# Patient Record
Sex: MALE | Race: WHITE | NOT HISPANIC OR LATINO
[De-identification: names, ages, dates, MRNs, and addresses within clinical notes are randomized per-mention and may not be internally consistent; named-entity substitution may affect disease eponyms.]

---

## 2017-01-19 ENCOUNTER — APPOINTMENT (OUTPATIENT)
Dept: RHEUMATOLOGY | Facility: CLINIC | Age: 37
End: 2017-01-19

## 2017-02-22 ENCOUNTER — APPOINTMENT (OUTPATIENT)
Dept: RHEUMATOLOGY | Facility: CLINIC | Age: 37
End: 2017-02-22

## 2017-03-23 ENCOUNTER — APPOINTMENT (OUTPATIENT)
Dept: RHEUMATOLOGY | Facility: CLINIC | Age: 37
End: 2017-03-23

## 2017-04-19 ENCOUNTER — APPOINTMENT (OUTPATIENT)
Dept: RHEUMATOLOGY | Facility: CLINIC | Age: 37
End: 2017-04-19

## 2017-05-16 ENCOUNTER — APPOINTMENT (OUTPATIENT)
Dept: RHEUMATOLOGY | Facility: CLINIC | Age: 37
End: 2017-05-16

## 2017-06-14 ENCOUNTER — APPOINTMENT (OUTPATIENT)
Dept: RHEUMATOLOGY | Facility: CLINIC | Age: 37
End: 2017-06-14

## 2017-06-16 ENCOUNTER — APPOINTMENT (OUTPATIENT)
Dept: RHEUMATOLOGY | Facility: CLINIC | Age: 37
End: 2017-06-16

## 2017-07-12 ENCOUNTER — APPOINTMENT (OUTPATIENT)
Dept: RHEUMATOLOGY | Facility: CLINIC | Age: 37
End: 2017-07-12

## 2017-08-09 ENCOUNTER — APPOINTMENT (OUTPATIENT)
Dept: RHEUMATOLOGY | Facility: CLINIC | Age: 37
End: 2017-08-09

## 2017-09-06 ENCOUNTER — APPOINTMENT (OUTPATIENT)
Dept: RHEUMATOLOGY | Facility: CLINIC | Age: 37
End: 2017-09-06
Payer: COMMERCIAL

## 2017-09-06 PROCEDURE — 96413 CHEMO IV INFUSION 1 HR: CPT

## 2018-05-15 ENCOUNTER — APPOINTMENT (OUTPATIENT)
Dept: COLORECTAL SURGERY | Facility: CLINIC | Age: 38
End: 2018-05-15

## 2019-04-08 ENCOUNTER — APPOINTMENT (OUTPATIENT)
Dept: INTERNAL MEDICINE | Facility: CLINIC | Age: 39
End: 2019-04-08

## 2020-02-18 ENCOUNTER — OUTPATIENT (OUTPATIENT)
Dept: OUTPATIENT SERVICES | Facility: HOSPITAL | Age: 40
LOS: 1 days | End: 2020-02-18
Payer: COMMERCIAL

## 2020-02-18 DIAGNOSIS — M25.512 PAIN IN LEFT SHOULDER: Chronic | ICD-10-CM

## 2020-02-18 DIAGNOSIS — R09.89 OTHER SPECIFIED SYMPTOMS AND SIGNS INVOLVING THE CIRCULATORY AND RESPIRATORY SYSTEMS: ICD-10-CM

## 2020-02-18 DIAGNOSIS — Z98.89 OTHER SPECIFIED POSTPROCEDURAL STATES: Chronic | ICD-10-CM

## 2020-02-18 PROCEDURE — 93922 UPR/L XTREMITY ART 2 LEVELS: CPT | Mod: 26

## 2020-02-18 PROCEDURE — 93923 UPR/LXTR ART STDY 3+ LVLS: CPT

## 2021-01-25 ENCOUNTER — NON-APPOINTMENT (OUTPATIENT)
Age: 41
End: 2021-01-25

## 2021-01-25 DIAGNOSIS — Z82.69 FAMILY HISTORY OF OTHER DISEASES OF THE MUSCULOSKELETAL SYSTEM AND CONNECTIVE TISSUE: ICD-10-CM

## 2021-01-25 DIAGNOSIS — Z86.19 PERSONAL HISTORY OF OTHER INFECTIOUS AND PARASITIC DISEASES: ICD-10-CM

## 2021-01-25 DIAGNOSIS — Z87.828 PERSONAL HISTORY OF OTHER (HEALED) PHYSICAL INJURY AND TRAUMA: ICD-10-CM

## 2021-01-25 DIAGNOSIS — M32.9 SYSTEMIC LUPUS ERYTHEMATOSUS, UNSPECIFIED: ICD-10-CM

## 2021-01-25 DIAGNOSIS — R76.8 OTHER SPECIFIED ABNORMAL IMMUNOLOGICAL FINDINGS IN SERUM: ICD-10-CM

## 2021-01-25 DIAGNOSIS — J45.909 UNSPECIFIED ASTHMA, UNCOMPLICATED: ICD-10-CM

## 2021-01-25 DIAGNOSIS — Z78.9 OTHER SPECIFIED HEALTH STATUS: ICD-10-CM

## 2021-01-25 DIAGNOSIS — K62.5 HEMORRHAGE OF ANUS AND RECTUM: ICD-10-CM

## 2021-01-25 DIAGNOSIS — M87.00 IDIOPATHIC ASEPTIC NECROSIS OF UNSPECIFIED BONE: ICD-10-CM

## 2021-01-25 DIAGNOSIS — Z98.890 OTHER SPECIFIED POSTPROCEDURAL STATES: ICD-10-CM

## 2021-02-08 ENCOUNTER — APPOINTMENT (OUTPATIENT)
Dept: RHEUMATOLOGY | Facility: CLINIC | Age: 41
End: 2021-02-08

## 2021-02-08 VITALS
TEMPERATURE: 97.6 F | HEART RATE: 95 BPM | OXYGEN SATURATION: 98 % | SYSTOLIC BLOOD PRESSURE: 126 MMHG | DIASTOLIC BLOOD PRESSURE: 79 MMHG | RESPIRATION RATE: 16 BRPM | WEIGHT: 202.4 LBS

## 2021-02-08 RX ORDER — METHYLPREDNISOLONE 16 MG/1
TABLET ORAL
Refills: 0 | Status: DISCONTINUED | COMMUNITY
End: 2021-02-08

## 2021-02-08 NOTE — ASSESSMENT
[FreeTextEntry1] : A/P\par 39 yo male with SLE for many years; MTX re-started/added to Benlysta 1/8/21. He still has moderate disease activity with polyarthritis andmalar rash; no improvement yet from the MTX but it is too soon. Plan:\par - check labs\par -increase MTX to 25 mg/week\par - increase prednisone to 30 mg qd and taper to 20 on 2/22/21 and back to 10 on 3/1/21. If necessary, he may return on 2/12/21 for a medrol infusion.\par - check 25-OH D level next visit\par - rtc 6 weeks\par - COVID-19 vaccine discussed; he plans to get this ASAP\par \par HUANG Mcguire\par [phone]\par [email or web address]\par

## 2021-02-08 NOTE — HISTORY OF PRESENT ILLNESS
[FreeTextEntry1] : Follow-up visit. The patient is a 39 yo  male with a history of:\par \par SLE: diagnosed 2008 with non-erosive polyarthritis, malar rash, leucopenia, fever, Raynauds, RICHELLE+, anti-DNA+, Ro+, RNP+, Sm+, La negative, LAC negative, ACL negative. He was treated initially with plaquenil, steroids and methotrexate. He was weaned off the steroids by  and continued plaquenil 200 qd and MTX 20 mg/week until 6/10 when he developed nephritis, recurrent joint pain and rash; told he did not need a kidney biopsy because the proteinuria was only 2+ and he was started on prednisone 60 mg qd.  mg BID added to prednisone 60 in 10/10 following an episode of headache (MRI results not available, LP: protein 22, wbc 3, rbc 106, VDRL negative). He remained on prednisone 60 qd and MMF 1000 mg qd until 12/10 when he started seeing me.  \par At his visit 12/28/10 he had 600 mg protein (results from 10/10 through 12/10 all had no proteinuria except one UA with 2+) and the MMF was increased to 3 gm qd. The proteinuria resolved but he had frequent episodes of recurrent arthritis, chilblains on the distal fingers, malar rash and was unable to taper the prednisone to less than 15 mg qd. MMF was stopped 3/23/11 and MTX was restarted.  He had a good response to the MTX which was increased to 25 mg/week; the prednisone was tapered off 10/11.  During this time the complement remained normal and the anti-DNA ab in the 50-90 range. His MTX was decreased to 20 mg/week at his visit 3/5/12. He had another flare with arthritis, malar rash, fatigue and increased anti-DNA  and was enrolled in a double blind study of a BAFF inhibitor 12 that extended to an open-label study  –  when the study ended. His SLE was well-controlled in the study. He continued on MTX 20 and HCQ but flared again 7/15 and was treated with solumedrol 250 mg IVSS and prednisone 10 mg qd was added. IV Benlysta was added 8/11/15 and tapered off the prednisone by 9/7/15. MTX was decreased to 15/week in 3/16, 10/week 16 and stopped 16. He had a mild flare with arthritis and malar rash  and subsequently switched to SQ Benlysta 10/16/18; was taking every other week until  when he switched to q week. Has had persistent disease activity with arthritis, malar rash since  despite enrollment in the AISHA-09 trial (lenabasum/placebo)  – . Required solumedrol 250 mg IVSS  and was on prednisone 10 qd since 18. Severe flare 19; increased steroids to 40 qd for 1 week then to 20. Solumedrol 250 IVSS again on 19 and azathioprine added but he decided not to take this and tapered steroids off by . Flare 10/20 with polyarthritis, patchy alopecia and malar rash on Benlysta; MTX re-started 21.\par \par AVN: has moderate to severe AVN in multiple sites; BL shoulders, BL hips, right knee. S/p BL shoulder replacements  and . Pain occasionally requires PRN Percocet. ACL ab and LAC negative on multiple occasions. S/p corticosteroid injection R hip 10/16\par \par Hemorrhoids: episodic BRBPR due to steroids\par \par Asthma: new onset . CT chest 19: unremarkable, PFTs c/w asthma as per pulmonologist (Dr. Langford, [phone]); resolved eventually with steroids and Breztri. Note: Ig levels 20 low normal,  Legionella, fungitell negative, Tcell subsets normal/high, CD19 48.\par \par ACL tear : severed ACL in an accident; had 2 arthrocenteses of bloody fluid and a corticosteroid injection\par \par Last Ophtho: \par \par Current Hx: The patient was last seen 21 and started MTX for polyarthritis. Says that nothing is better; all joints are swollen and hurt. No wheezing and he has stopped the Breztri. I thought he had stopped the Benlysta but he is still taking it once a week. Denies fevers, nausea, vomiting, diarrhea, abdominal pain, or weight loss. \par \par Allergies:  NSAIDS: cause throat and lip swelling, PCN: rash\par \par Medications:\par Prednisone 10 (started 10/21/20; tapered to 10 on 21) \par Simvastatin 20 (20)\par plaquenil 400 mg qd ((12/28/10)\par CaCO3/vit D BID\par Nifedepine XL 30 mg (11; not taking in the summer)\par Albuterol PRN\par Breztri 2 puffs BID\par Folic acid 1 mg\par Methotrexate 20/week (21)\par Benlysta 200 mg SC/week (start IV 8/15, switch to SC 10/17)\par \par Social: no history of ETOH, tobacco or drug abuse\par             , employed as a , 1 biologic son, 1 adopted son\par \par Family Hx: paternal aunt  of SLE\par Past Hx:  tinea treated with itraconazole \par

## 2021-02-12 ENCOUNTER — APPOINTMENT (OUTPATIENT)
Dept: RHEUMATOLOGY | Facility: CLINIC | Age: 41
End: 2021-02-12

## 2021-02-12 VITALS
SYSTOLIC BLOOD PRESSURE: 125 MMHG | DIASTOLIC BLOOD PRESSURE: 81 MMHG | RESPIRATION RATE: 16 BRPM | OXYGEN SATURATION: 99 % | HEART RATE: 81 BPM | WEIGHT: 201 LBS | TEMPERATURE: 97.7 F

## 2021-02-12 NOTE — HISTORY OF PRESENT ILLNESS
[FreeTextEntry1] : Patient here for solumedrol infusion 250 mg IVSS x 1. He had increased the oral steroids and MTX as discussed at the visit on 2/8/21 but is still experiencing moderate/severe joint pain. He tolerated the infusion well and will follow up in 5 weeks.

## 2021-03-24 ENCOUNTER — APPOINTMENT (OUTPATIENT)
Dept: RHEUMATOLOGY | Facility: CLINIC | Age: 41
End: 2021-03-24

## 2021-03-24 VITALS
WEIGHT: 204.4 LBS | TEMPERATURE: 97.4 F | DIASTOLIC BLOOD PRESSURE: 74 MMHG | RESPIRATION RATE: 18 BRPM | SYSTOLIC BLOOD PRESSURE: 120 MMHG | OXYGEN SATURATION: 96 % | HEART RATE: 93 BPM

## 2021-03-24 RX ORDER — MULTIVITAMIN/IRON/FOLIC ACID 18MG-0.4MG
500-400 TABLET ORAL
Qty: 60 | Refills: 5 | Status: ACTIVE | COMMUNITY
Start: 2021-03-24 | End: 1900-01-01

## 2021-03-24 NOTE — ASSESSMENT
[FreeTextEntry1] : A/P\par 41 yo male with SLE for many years; MTX re-started/added to Benlysta 1/8/21. He still has mild/moderate disease activity with polyarthritis and malar rash. There has been some improvement from the steroids and MTX; will give the MTX more time. If there is continued improvemment he should stay on the Benlysta. If not, he should DC it in 2 weeks.Plan:\par - check labs including 25-OH D\par - continue current medications and decrease prednisone to 7.5 if possible\par - rtc 5/12/21; obtain post-vaccine labs then\par \par HUANG Mcguire\par [phone]\par [email or web address]\par

## 2021-03-24 NOTE — HISTORY OF PRESENT ILLNESS
[FreeTextEntry1] : Follow-up visit. The patient is a 41 yo  male with a history of:\par \par SLE: diagnosed 2008 with non-erosive polyarthritis, malar rash, leucopenia, fever, Raynauds, RICHELLE+, anti-DNA+, Ro+, RNP+, Sm+, La negative, LAC negative, ACL negative. He was treated initially with plaquenil, steroids and methotrexate. He was weaned off the steroids by  and continued plaquenil 200 qd and MTX 20 mg/week until 6/10 when he developed nephritis, recurrent joint pain and rash; told he did not need a kidney biopsy because the proteinuria was only 2+ and he was started on prednisone 60 mg qd.  mg BID added to prednisone 60 in 10/10 following an episode of headache (MRI results not available, LP: protein 22, wbc 3, rbc 106, VDRL negative). He remained on prednisone 60 qd and MMF 1000 mg qd until 12/10 when he started seeing me.  \par At his visit 12/28/10 he had 600 mg protein (results from 10/10 through 12/10 all had no proteinuria except one UA with 2+) and the MMF was increased to 3 gm qd. The proteinuria resolved but he had frequent episodes of recurrent arthritis, chilblains on the distal fingers, malar rash and was unable to taper the prednisone to less than 15 mg qd. MMF was stopped 3/23/11 and MTX was restarted.  He had a good response to the MTX which was increased to 25 mg/week; the prednisone was tapered off 10/11.  During this time the complement remained normal and the anti-DNA ab in the 50-90 range. His MTX was decreased to 20 mg/week at his visit 3/5/12. He had another flare with arthritis, malar rash, fatigue and increased anti-DNA  and was enrolled in a double blind study of a BAFF inhibitor 12 that extended to an open-label study  –  when the study ended. His SLE was well-controlled in the study. He continued on MTX 20 and HCQ but flared again 7/15 and was treated with solumedrol 250 mg IVSS and prednisone 10 mg qd was added. IV Benlysta was added 8/11/15 and tapered off the prednisone by 9/7/15. MTX was decreased to 15/week in 3/16, 10/week 16 and stopped 16. He had a mild flare with arthritis and malar rash  and subsequently switched to SQ Benlysta 10/16/18; was taking every other week until  when he switched to q week. Has had persistent disease activity with arthritis, malar rash since  despite enrollment in the AISHA-09 trial (lenabasum/placebo)  – . Required solumedrol 250 mg IVSS  and was on prednisone 10 qd since 18. Severe flare 19; increased steroids to 40 qd for 1 week then to 20. Solumedrol 250 IVSS again on 19 and azathioprine added but he decided not to take this and tapered steroids off by . Flare 10/20 with polyarthritis, patchy alopecia and malar rash on Benlysta; MTX re-started 21.\par \par AVN: has moderate to severe AVN in multiple sites; BL shoulders, BL hips, right knee. S/p BL shoulder replacements  and . Pain occasionally requires PRN Percocet. ACL ab and LAC negative on multiple occasions. S/p corticosteroid injection R hip 10/16\par \par Hemorrhoids: episodic BRBPR due to steroids\par \par Asthma: new onset . CT chest 19: unremarkable, PFTs c/w asthma as per pulmonologist (Dr. Langford, [phone]); resolved eventually with steroids and Breztri. Note: Ig levels 20 low normal,  Legionella, fungitell negative, Tcell subsets normal/high, CD19 48.\par \par ACL tear : severed ACL in an accident; had 2 arthrocenteses of bloody fluid and a corticosteroid injection\par \par Last Ophtho: \par \par Current Hx: The patient was last seen 21; still had severe polyarthritis and MTX was increased to 25/week. He also had a solumedrol 250 mg infusion on 21 that helped. He says he still has joint pain but not as bad- it is tolerable and he tapered the prednisone to 10 on 3/1/21. Developed a stye in the R eye last week; minimal improvement with warm soaks. Denies fevers, nausea, vomiting, diarrhea, abdominal pain, or weight loss. Had the J&J COVID vaccine on 3/5/21: experienced increased joint/muscle pain, HA, fatigue for 1 day, no fevers, + sore arm for 1 week. \par \par Allergies:  NSAIDS: cause throat and lip swelling, PCN: rash\par \par Medications:\par Prednisone 10 (started 10/21/20; tapered to 10 on 3/1/21) \par Simvastatin 20 (20)\par plaquenil 400 mg qd ((12/28/10)\par CaCO3/vit D BID\par Nifedepine XL 30 mg (11; not taking in the summer)\par Albuterol PRN\par Breztri 2 puffs BID\par Folic acid 1 mg\par Methotrexate 25/week (21)\par Benlysta 200 mg SC/week (start IV 8/15, switch to SC 10/17)\par \par Social: no history of ETOH, tobacco or drug abuse\par             , employed as a , 1 biologic son, 1 adopted son\par \par Family Hx: paternal aunt  of SLE\par Past Hx:  tinea treated with itraconazole \par

## 2021-04-14 LAB
25(OH)D3 SERPL-MCNC: 24.2 NG/ML
ALBUMIN SERPL ELPH-MCNC: 4.1 G/DL
ALP BLD-CCNC: 69 U/L
ALT SERPL-CCNC: 20 U/L
ANION GAP SERPL CALC-SCNC: 15 MMOL/L
APPEARANCE: CLEAR
AST SERPL-CCNC: 18 U/L
BACTERIA: NEGATIVE
BASOPHILS # BLD AUTO: 0.07 K/UL
BASOPHILS NFR BLD AUTO: 0.7 %
BILIRUB SERPL-MCNC: 0.3 MG/DL
BILIRUBIN URINE: NEGATIVE
BLOOD URINE: NEGATIVE
BUN SERPL-MCNC: 19 MG/DL
C3 SERPL-MCNC: 127 MG/DL
C4 SERPL-MCNC: 26 MG/DL
CALCIUM SERPL-MCNC: 9.1 MG/DL
CHLORIDE SERPL-SCNC: 98 MMOL/L
CO2 SERPL-SCNC: 27 MMOL/L
COLOR: COLORLESS
CREAT SERPL-MCNC: 0.88 MG/DL
CRP SERPL-MCNC: <3 MG/L
DSDNA AB SER-ACNC: <12 IU/ML
EOSINOPHIL # BLD AUTO: 0.08 K/UL
EOSINOPHIL NFR BLD AUTO: 0.8 %
GLUCOSE QUALITATIVE U: NEGATIVE
GLUCOSE SERPL-MCNC: 54 MG/DL
HCT VFR BLD CALC: 42.2 %
HGB BLD-MCNC: 14.1 G/DL
HYALINE CASTS: 0 /LPF
IMM GRANULOCYTES NFR BLD AUTO: 0.5 %
KETONES URINE: NEGATIVE
LEUKOCYTE ESTERASE URINE: NEGATIVE
LYMPHOCYTES # BLD AUTO: 3.79 K/UL
LYMPHOCYTES NFR BLD AUTO: 38 %
MAN DIFF?: NORMAL
MCHC RBC-ENTMCNC: 32 PG
MCHC RBC-ENTMCNC: 33.4 GM/DL
MCV RBC AUTO: 95.7 FL
MICROSCOPIC-UA: NORMAL
MONOCYTES # BLD AUTO: 0.7 K/UL
MONOCYTES NFR BLD AUTO: 7 %
NEUTROPHILS # BLD AUTO: 5.29 K/UL
NEUTROPHILS NFR BLD AUTO: 53 %
NITRITE URINE: NEGATIVE
PH URINE: 5.5
PLATELET # BLD AUTO: 341 K/UL
POTASSIUM SERPL-SCNC: 3.4 MMOL/L
PROT SERPL-MCNC: 6.3 G/DL
PROTEIN URINE: NEGATIVE
RBC # BLD: 4.41 M/UL
RBC # FLD: 12.1 %
RED BLOOD CELLS URINE: 0 /HPF
SODIUM SERPL-SCNC: 140 MMOL/L
SPECIFIC GRAVITY URINE: 1.01
SQUAMOUS EPITHELIAL CELLS: 0 /HPF
UROBILINOGEN URINE: NORMAL
WBC # FLD AUTO: 9.98 K/UL
WHITE BLOOD CELLS URINE: 0 /HPF

## 2021-05-24 ENCOUNTER — APPOINTMENT (OUTPATIENT)
Dept: RHEUMATOLOGY | Facility: CLINIC | Age: 41
End: 2021-05-24

## 2021-06-16 ENCOUNTER — LABORATORY RESULT (OUTPATIENT)
Age: 41
End: 2021-06-16

## 2021-06-16 ENCOUNTER — APPOINTMENT (OUTPATIENT)
Dept: RHEUMATOLOGY | Facility: CLINIC | Age: 41
End: 2021-06-16

## 2021-06-16 VITALS
WEIGHT: 208.4 LBS | HEART RATE: 98 BPM | RESPIRATION RATE: 14 BRPM | DIASTOLIC BLOOD PRESSURE: 89 MMHG | SYSTOLIC BLOOD PRESSURE: 132 MMHG | TEMPERATURE: 96.7 F | OXYGEN SATURATION: 100 %

## 2021-06-16 DIAGNOSIS — E78.00 PURE HYPERCHOLESTEROLEMIA, UNSPECIFIED: ICD-10-CM

## 2021-06-16 RX ORDER — BELIMUMAB 200 MG/ML
200 SOLUTION SUBCUTANEOUS
Refills: 0 | Status: DISCONTINUED | COMMUNITY
Start: 2020-06-26 | End: 2021-06-16

## 2021-06-16 RX ORDER — SIMVASTATIN 80 MG/1
TABLET, FILM COATED ORAL
Refills: 0 | Status: DISCONTINUED | COMMUNITY
End: 2021-06-16

## 2021-06-16 NOTE — ASSESSMENT
[FreeTextEntry1] : A/P\par 42 yo male with SLE for many years; MTX re-started/added to Benlysta 1/8/21. He continues to have moderate disease activity with polyarthritis and malar rash despite re-starting the MTX. Benlysta stopped due to lack of efficacy. Discussed therapeutic options including azathioprine, clinical trial. Plan:\par - check labs including anti-spike ab\par - he will think about treatment options\par - add alendronate 70 mg /week and ergocalciferol 50,000 units/week x 8 weeks\par - continue current medications \par - has an appt with his orthopedic surgeon, Dr. Paget at [facility] in July- may have THR\par - f/u 6 weeks\par \par HUANG Mcguire\par [phone]\par [email or web address]\par

## 2021-06-16 NOTE — HISTORY OF PRESENT ILLNESS
[FreeTextEntry1] : Follow-up visit. The patient is a 42 yo  male with a history of:\par \par SLE: diagnosed 2008 with non-erosive polyarthritis, malar rash, leucopenia, fever, Raynauds, RICHELLE+, anti-DNA+, Ro+, RNP+, Sm+, La negative, LAC negative, ACL negative. He was treated initially with plaquenil, steroids and methotrexate. He was weaned off the steroids by  and continued plaquenil 200 qd and MTX 20 mg/week until 6/10 when he developed nephritis, recurrent joint pain and rash; told he did not need a kidney biopsy because the proteinuria was only 2+ and he was started on prednisone 60 mg qd.  mg BID added to prednisone 60 in 10/10 following an episode of headache (MRI results not available, LP: protein 22, wbc 3, rbc 106, VDRL negative). He remained on prednisone 60 qd and MMF 1000 mg qd until 12/10 when he started seeing me.  \par At his visit 12/28/10 he had 600 mg protein (results from 10/10 through 12/10 all had no proteinuria except one UA with 2+) and the MMF was increased to 3 gm qd. The proteinuria resolved but he had frequent episodes of recurrent arthritis, chilblains on the distal fingers, malar rash and was unable to taper the prednisone to less than 15 mg qd. MMF was stopped 3/23/11 and MTX was restarted.  He had a good response to the MTX which was increased to 25 mg/week; the prednisone was tapered off 10/11.  During this time the complement remained normal and the anti-DNA ab in the 50-90 range. His MTX was decreased to 20 mg/week at his visit 3/5/12. He had another flare with arthritis, malar rash, fatigue and increased anti-DNA  and was enrolled in a double blind study of a BAFF inhibitor 12 that extended to an open-label study  –  when the study ended. His SLE was well-controlled in the study. He continued on MTX 20 and HCQ but flared again 7/15 and was treated with solumedrol 250 mg IVSS and prednisone 10 mg qd was added. IV Benlysta was added 8/11/15 and tapered off the prednisone by 9/7/15. MTX was decreased to 15/week in 3/16, 10/week 16 and stopped 16. He had a mild flare with arthritis and malar rash  and subsequently switched to SQ Benlysta 10/16/18; was taking every other week until  when he switched to q week. Has had persistent disease activity with arthritis, malar rash since  despite enrollment in the AISHA-09 trial (lenabasum/placebo)  – . Required solumedrol 250 mg IVSS  and was on prednisone 10 qd since 18. Severe flare 19; increased steroids to 40 qd for 1 week then to 20. Solumedrol 250 IVSS again on 19 and azathioprine added but he decided not to take this and tapered steroids off by . Flare 10/20 with polyarthritis, patchy alopecia and malar rash on Benlysta; MTX re-started 21. Benlysta stopped (last dose 3/27/21) due to lack of efficacy.\par \par AVN: has moderate to severe AVN in multiple sites; BL shoulders, BL hips, right knee. S/p BL shoulder replacements  and . Pain occasionally requires PRN Percocet. ACL ab and LAC negative on multiple occasions. S/p corticosteroid injection R hip 10/16\par \par Hemorrhoids: episodic BRBPR due to steroids\par \par Asthma: new onset . CT chest 19: unremarkable, PFTs c/w asthma as per pulmonologist (Dr. Langford, [phone]); resolved eventually with steroids and Breztri. Note: Ig levels 20 low normal,  Legionella, fungitell negative, Tcell subsets normal/high, CD19 48.\par \par ACL tear : severed ACL in an accident; had 2 arthrocenteses of bloody fluid and a corticosteroid injection\par \par Last Ophtho: \par \par J&J COVID vaccine on 3/5/21: experienced increased joint/muscle pain, HA, fatigue for 1 day, no fevers, + sore arm for 1 week. \par \par Current Hx: The patient was last seen ; polyarthritis only slightly improved on higher dose MTX. He stopped the benlysta 3/27/21 (last dose) due to lack of efficacy. Since then he continues to have pain and swelling in the elbows, hands, knees and left ankle. Also developed sever pain in the left foot that was diagnosed as plantar fasciitis and responded to a corticosteroid injection. Has started having recurrent episodes of severe right hip pain requiring a cane to walk sometimes due to AVN. Stepped on a nail that punctured the left foot- treated in Urgent Care with debridement, tetanus shot and a course of levaquin. Also notes increased overall fatigue but also has increased stress- his mother  on 21 after prolonged illness. Denies fevers, nausea, vomiting, diarrhea, abdominal pain, or weight loss. \par \par Allergies:  NSAIDS: cause throat and lip swelling, PCN: rash\par \par Medications:\par Prednisone 10 (started 10/21/20; tapered to 10 on 3/1/21) \par Simvastatin 20 (20)\par plaquenil 400 mg qd ((12/28/10)\par CaCO3/vit D BID\par Nifedepine XL 30 mg (11; not taking in the summer)\par Albuterol PRN\par Breztri 2 puffs BID\par Folic acid 1 mg\par Methotrexate 25/week (21)\par \par \par Social: no history of ETOH, tobacco or drug abuse\par             , employed as a , 1 biologic son, 1 adopted son\par \par Family Hx: paternal aunt  of SLE\par Past Hx:  tinea treated with itraconazole \par

## 2021-07-02 LAB
ALBUMIN SERPL ELPH-MCNC: 4.4 G/DL
ALP BLD-CCNC: 79 U/L
ALT SERPL-CCNC: 21 U/L
ANION GAP SERPL CALC-SCNC: 17 MMOL/L
APPEARANCE: CLEAR
AST SERPL-CCNC: 23 U/L
BACTERIA: NEGATIVE
BASOPHILS # BLD AUTO: 0.04 K/UL
BASOPHILS NFR BLD AUTO: 0.4 %
BILIRUB SERPL-MCNC: 0.2 MG/DL
BILIRUBIN URINE: NEGATIVE
BLOOD URINE: NEGATIVE
BUN SERPL-MCNC: 25 MG/DL
C3 SERPL-MCNC: 148 MG/DL
C4 SERPL-MCNC: 26 MG/DL
CALCIUM SERPL-MCNC: 9.4 MG/DL
CHLORIDE SERPL-SCNC: 99 MMOL/L
CO2 SERPL-SCNC: 24 MMOL/L
COLOR: NORMAL
CREAT SERPL-MCNC: 0.86 MG/DL
CREAT SPEC-SCNC: 163 MG/DL
CREAT/PROT UR: 0.1 RATIO
DSDNA AB SER-ACNC: <12 IU/ML
EOSINOPHIL # BLD AUTO: 0.08 K/UL
EOSINOPHIL NFR BLD AUTO: 0.9 %
GLUCOSE QUALITATIVE U: NEGATIVE
GLUCOSE SERPL-MCNC: 56 MG/DL
HCT VFR BLD CALC: 42.1 %
HGB BLD-MCNC: 13.4 G/DL
HYALINE CASTS: 1 /LPF
IMM GRANULOCYTES NFR BLD AUTO: 0.3 %
KETONES URINE: NEGATIVE
LEUKOCYTE ESTERASE URINE: NEGATIVE
LYMPHOCYTES # BLD AUTO: 1.3 K/UL
LYMPHOCYTES NFR BLD AUTO: 14.4 %
MAN DIFF?: NORMAL
MCHC RBC-ENTMCNC: 30.8 PG
MCHC RBC-ENTMCNC: 31.8 GM/DL
MCV RBC AUTO: 96.8 FL
MICROSCOPIC-UA: NORMAL
MONOCYTES # BLD AUTO: 0.33 K/UL
MONOCYTES NFR BLD AUTO: 3.6 %
NEUTROPHILS # BLD AUTO: 7.27 K/UL
NEUTROPHILS NFR BLD AUTO: 80.4 %
NITRITE URINE: NEGATIVE
PH URINE: 5.5
PLATELET # BLD AUTO: 326 K/UL
POTASSIUM SERPL-SCNC: 4.1 MMOL/L
PROT SERPL-MCNC: 6.2 G/DL
PROT UR-MCNC: 13 MG/DL
PROTEIN URINE: NORMAL
RBC # BLD: 4.35 M/UL
RBC # FLD: 12.8 %
RED BLOOD CELLS URINE: 0 /HPF
SODIUM SERPL-SCNC: 141 MMOL/L
SPECIFIC GRAVITY URINE: 1.02
SQUAMOUS EPITHELIAL CELLS: 0 /HPF
UROBILINOGEN URINE: NORMAL
WBC # FLD AUTO: 9.05 K/UL
WHITE BLOOD CELLS URINE: 0 /HPF

## 2021-07-28 ENCOUNTER — APPOINTMENT (OUTPATIENT)
Dept: RHEUMATOLOGY | Facility: CLINIC | Age: 41
End: 2021-07-28

## 2021-07-28 VITALS
DIASTOLIC BLOOD PRESSURE: 87 MMHG | WEIGHT: 207.2 LBS | RESPIRATION RATE: 14 BRPM | OXYGEN SATURATION: 98 % | TEMPERATURE: 96.9 F | SYSTOLIC BLOOD PRESSURE: 132 MMHG | HEART RATE: 97 BPM

## 2021-07-28 LAB
ALBUMIN SERPL ELPH-MCNC: 4.7 G/DL
ALP BLD-CCNC: 83 U/L
ALT SERPL-CCNC: 23 U/L
ANION GAP SERPL CALC-SCNC: 12 MMOL/L
APPEARANCE: CLEAR
AST SERPL-CCNC: 23 U/L
BACTERIA: NEGATIVE
BASOPHILS # BLD AUTO: 0.05 K/UL
BASOPHILS NFR BLD AUTO: 0.6 %
BILIRUB SERPL-MCNC: 0.2 MG/DL
BILIRUBIN URINE: NEGATIVE
BLOOD URINE: NEGATIVE
BUN SERPL-MCNC: 22 MG/DL
CALCIUM SERPL-MCNC: 9.1 MG/DL
CHLORIDE SERPL-SCNC: 104 MMOL/L
CO2 SERPL-SCNC: 22 MMOL/L
COLOR: YELLOW
CREAT SERPL-MCNC: 1.01 MG/DL
EOSINOPHIL # BLD AUTO: 0.03 K/UL
EOSINOPHIL NFR BLD AUTO: 0.3 %
GLUCOSE QUALITATIVE U: NEGATIVE
GLUCOSE SERPL-MCNC: 115 MG/DL
HCT VFR BLD CALC: 41.3 %
HGB BLD-MCNC: 14.2 G/DL
HYALINE CASTS: 1 /LPF
IMM GRANULOCYTES NFR BLD AUTO: 0.3 %
KETONES URINE: NEGATIVE
LEUKOCYTE ESTERASE URINE: NEGATIVE
LYMPHOCYTES # BLD AUTO: 1.01 K/UL
LYMPHOCYTES NFR BLD AUTO: 11.3 %
MAN DIFF?: NORMAL
MCHC RBC-ENTMCNC: 31.6 PG
MCHC RBC-ENTMCNC: 34.4 GM/DL
MCV RBC AUTO: 91.8 FL
MICROSCOPIC-UA: NORMAL
MONOCYTES # BLD AUTO: 0.32 K/UL
MONOCYTES NFR BLD AUTO: 3.6 %
NEUTROPHILS # BLD AUTO: 7.52 K/UL
NEUTROPHILS NFR BLD AUTO: 83.9 %
NITRITE URINE: NEGATIVE
PH URINE: 5.5
PLATELET # BLD AUTO: 339 K/UL
POTASSIUM SERPL-SCNC: 4.8 MMOL/L
PROT SERPL-MCNC: 6.9 G/DL
PROTEIN URINE: ABNORMAL
RBC # BLD: 4.5 M/UL
RBC # FLD: 13 %
RED BLOOD CELLS URINE: 1 /HPF
SODIUM SERPL-SCNC: 139 MMOL/L
SPECIFIC GRAVITY URINE: 1.04
SQUAMOUS EPITHELIAL CELLS: 0 /HPF
UROBILINOGEN URINE: ABNORMAL
WBC # FLD AUTO: 8.96 K/UL
WHITE BLOOD CELLS URINE: 1 /HPF

## 2021-07-28 NOTE — HISTORY OF PRESENT ILLNESS
[FreeTextEntry1] : Follow-up visit. The patient is a 42 yo  male with a history of:\par \par SLE: diagnosed 2008 with non-erosive polyarthritis, malar rash, leucopenia, fever, Raynauds, RICHELLE+, anti-DNA+, Ro+, RNP+, Sm+, La negative, LAC negative, ACL negative. He was treated initially with plaquenil, steroids and methotrexate. He was weaned off the steroids by  and continued plaquenil 200 qd and MTX 20 mg/week until 6/10 when he developed nephritis, recurrent joint pain and rash; told he did not need a kidney biopsy because the proteinuria was only 2+ and he was started on prednisone 60 mg qd.  mg BID added to prednisone 60 in 10/10 following an episode of headache (MRI results not available, LP: protein 22, wbc 3, rbc 106, VDRL negative). He remained on prednisone 60 qd and MMF 1000 mg qd until 12/10 when he started seeing me.  \par At his visit 12/28/10 he had 600 mg protein (results from 10/10 through 12/10 all had no proteinuria except one UA with 2+) and the MMF was increased to 3 gm qd. The proteinuria resolved but he had frequent episodes of recurrent arthritis, chilblains on the distal fingers, malar rash and was unable to taper the prednisone to less than 15 mg qd. MMF was stopped 3/23/11 and MTX was restarted.  He had a good response to the MTX which was increased to 25 mg/week; the prednisone was tapered off 10/11.  During this time the complement remained normal and the anti-DNA ab in the 50-90 range. His MTX was decreased to 20 mg/week at his visit 3/5/12. He had another flare with arthritis, malar rash, fatigue and increased anti-DNA  and was enrolled in a double blind study of a BAFF inhibitor 12 that extended to an open-label study  –  when the study ended. His SLE was well-controlled in the study. He continued on MTX 20 and HCQ but flared again 7/15 and was treated with solumedrol 250 mg IVSS and prednisone 10 mg qd was added. IV Benlysta was added 8/11/15 and tapered off the prednisone by 9/7/15. MTX was decreased to 15/week in 3/16, 10/week 16 and stopped 16. He had a mild flare with arthritis and malar rash  and subsequently switched to SQ Benlysta 10/16/18; was taking every other week until  when he switched to q week. Has had persistent disease activity with arthritis, malar rash since  despite enrollment in the AISHA-09 trial (lenabasum/placebo)  – . Required solumedrol 250 mg IVSS  and was on prednisone 10 qd since 18. Severe flare 19; increased steroids to 40 qd for 1 week then to 20. Solumedrol 250 IVSS again on 19 and azathioprine added but he decided not to take this and tapered steroids off by . Flare 10/20 with polyarthritis, patchy alopecia and malar rash on Benlysta; MTX re-started 21. Benlysta stopped (last dose 3/27/21) due to lack of efficacy.\par \par AVN: has moderate to severe AVN in multiple sites; BL shoulders, BL hips, right knee. S/p BL shoulder replacements  and . Pain occasionally requires PRN Percocet. ACL ab and LAC negative on multiple occasions. S/p corticosteroid injection R hip 10/16\par \par Hemorrhoids: episodic BRBPR due to steroids\par \par Asthma: new onset . CT chest 19: unremarkable, PFTs c/w asthma as per pulmonologist (Dr. Langford, [phone]); resolved eventually with steroids and Breztri. Note: Ig levels 20 low normal,  Legionella, fungitell negative, Tcell subsets normal/high, CD19 48.\par \par ACL tear : severed ACL in an accident; had 2 arthrocenteses of bloody fluid and a corticosteroid injection\par \par Last Ophtho: \par \par J&J COVID vaccine on 3/5/21: experienced increased joint/muscle pain, HA, fatigue for 1 day, no fevers, + sore arm for 1 week. \par \par Current Hx: The patient was last seen 21; continued to have polyarthritis on the MTX 25/week. Since then he continues to have pain and swelling in the elbows, hands, and left ankle, foot. His right hip pain is worse- seen by Orthopedic surgery and X-rays show collapse of the femoral head. THR is scheduled for 10/6/21. The increased overall fatigue has continued- he is wondering if it may be related to the COVID vaccine. Denies fevers, nausea, vomiting, diarrhea, abdominal pain, or weight loss. \par \par Allergies:  NSAIDS: cause throat and lip swelling, PCN: rash\par \par Medications:\par Prednisone 10 (started 10/21/20; tapered to 10 on 3/1/21) \par Simvastatin 20 (20)\par plaquenil 400 mg qd ((12/28/10)\par CaCO3/vit D BID\par Nifedepine XL 30 mg (11; not taking in the summer)\par Albuterol PRN\par Breztri 2 puffs BID\par Folic acid 1 mg\par Methotrexate 25/week (21)\par \par \par Social: no history of ETOH, tobacco or drug abuse\par             , employed as a , 1 biologic son, 1 adopted son\par \par Family Hx: paternal aunt  of SLE\par Past Hx:  tinea treated with itraconazole \par

## 2021-07-28 NOTE — ASSESSMENT
[FreeTextEntry1] : A/P\par 42 yo male with SLE for many years; MTX re-started/added to Benlysta 1/8/21. He continues to have moderate disease activity with polyarthritis and malar rash despite re-starting the MTX. Benlysta stopped due to lack of efficacy. The arthritis is a little better today than last visit but is still present and affecting quality of life. Fatigue is moderate to severe and is affecting work which is new for him. Had been considering a clinical trial but this will have to be on hold until after the hip surgery. He took 1 dose of the alendronate and had diarrhea and stopped it. Plan:\par - check labs \par - continue current medications, try the alendronate again\par - f/u 6 weeks- will need a letter for the surgery and stress dose steroids\par \par HUANG Mcguire\par [phone]\par [email or web address]\par

## 2021-07-30 LAB
C3 SERPL-MCNC: 141 MG/DL
C4 SERPL-MCNC: 30 MG/DL
DSDNA AB SER-ACNC: <12 IU/ML

## 2021-09-05 RX ORDER — PREDNISONE 20 MG/1
20 TABLET ORAL
Qty: 60 | Refills: 3 | Status: DISCONTINUED | COMMUNITY
Start: 2020-11-17 | End: 2021-09-05

## 2021-09-05 RX ORDER — ESOMEPRAZOLE MAGNESIUM 40 MG/1
40 CAPSULE, DELAYED RELEASE ORAL
Refills: 0 | Status: DISCONTINUED | COMMUNITY
End: 2021-09-05

## 2021-09-05 RX ORDER — ERGOCALCIFEROL 1.25 MG/1
1.25 MG CAPSULE, LIQUID FILLED ORAL
Qty: 4 | Refills: 0 | Status: DISCONTINUED | COMMUNITY
Start: 2021-01-08 | End: 2021-09-05

## 2021-09-05 RX ORDER — ATORVASTATIN CALCIUM 80 MG/1
TABLET, FILM COATED ORAL
Refills: 0 | Status: DISCONTINUED | COMMUNITY
End: 2021-09-05

## 2022-02-16 ENCOUNTER — APPOINTMENT (OUTPATIENT)
Dept: RHEUMATOLOGY | Facility: CLINIC | Age: 42
End: 2022-02-16

## 2022-02-16 VITALS
OXYGEN SATURATION: 99 % | DIASTOLIC BLOOD PRESSURE: 82 MMHG | SYSTOLIC BLOOD PRESSURE: 120 MMHG | HEART RATE: 81 BPM | RESPIRATION RATE: 15 BRPM | WEIGHT: 189.6 LBS | TEMPERATURE: 97.3 F

## 2022-02-16 LAB
ALBUMIN SERPL ELPH-MCNC: 4.5 G/DL
ALP BLD-CCNC: 87 U/L
ALT SERPL-CCNC: 12 U/L
ANION GAP SERPL CALC-SCNC: 17 MMOL/L
AST SERPL-CCNC: 22 U/L
BASOPHILS # BLD AUTO: 0.04 K/UL
BASOPHILS NFR BLD AUTO: 0.7 %
BILIRUB SERPL-MCNC: 0.3 MG/DL
BUN SERPL-MCNC: 15 MG/DL
CALCIUM SERPL-MCNC: 9.3 MG/DL
CHLORIDE SERPL-SCNC: 102 MMOL/L
CO2 SERPL-SCNC: 22 MMOL/L
CREAT SERPL-MCNC: 0.83 MG/DL
EOSINOPHIL # BLD AUTO: 0.04 K/UL
EOSINOPHIL NFR BLD AUTO: 0.7 %
GLUCOSE SERPL-MCNC: 54 MG/DL
HCT VFR BLD CALC: 44.5 %
HGB BLD-MCNC: 13.9 G/DL
IMM GRANULOCYTES NFR BLD AUTO: 0.2 %
LYMPHOCYTES # BLD AUTO: 1.64 K/UL
LYMPHOCYTES NFR BLD AUTO: 28.7 %
MAN DIFF?: NORMAL
MCHC RBC-ENTMCNC: 28.2 PG
MCHC RBC-ENTMCNC: 31.2 GM/DL
MCV RBC AUTO: 90.3 FL
MONOCYTES # BLD AUTO: 0.43 K/UL
MONOCYTES NFR BLD AUTO: 7.5 %
NEUTROPHILS # BLD AUTO: 3.56 K/UL
NEUTROPHILS NFR BLD AUTO: 62.2 %
PLATELET # BLD AUTO: 409 K/UL
POTASSIUM SERPL-SCNC: 4.6 MMOL/L
PROT SERPL-MCNC: 6.9 G/DL
RBC # BLD: 4.93 M/UL
RBC # FLD: 14.5 %
SODIUM SERPL-SCNC: 141 MMOL/L
WBC # FLD AUTO: 5.72 K/UL

## 2022-02-16 RX ORDER — SIMVASTATIN 20 MG/1
20 TABLET, FILM COATED ORAL DAILY
Qty: 30 | Refills: 5 | Status: ACTIVE | COMMUNITY
Start: 2021-06-16 | End: 1900-01-01

## 2022-02-16 RX ORDER — HYDROXYCHLOROQUINE SULFATE 200 MG/1
200 TABLET, FILM COATED ORAL
Qty: 180 | Refills: 3 | Status: ACTIVE | COMMUNITY
Start: 1900-01-01 | End: 1900-01-01

## 2022-02-16 RX ORDER — FOLIC ACID 1 MG/1
1 TABLET ORAL
Qty: 90 | Refills: 3 | Status: ACTIVE | COMMUNITY
Start: 2020-05-21 | End: 1900-01-01

## 2022-02-16 RX ORDER — METHOTREXATE 2.5 MG/1
2.5 TABLET ORAL
Qty: 90 | Refills: 3 | Status: ACTIVE | COMMUNITY
Start: 2021-01-08 | End: 1900-01-01

## 2022-02-16 RX ORDER — PREDNISONE 10 MG/1
10 TABLET ORAL DAILY
Qty: 60 | Refills: 5 | Status: DISCONTINUED | COMMUNITY
Start: 2021-09-05 | End: 2022-02-16

## 2022-02-16 RX ORDER — BUDESONIDE, GLYCOPYRROLATE, AND FORMOTEROL FUMARATE 160; 9; 4.8 UG/1; UG/1; UG/1
160-9-4.8 AEROSOL, METERED RESPIRATORY (INHALATION)
Qty: 1 | Refills: 1 | Status: ACTIVE | COMMUNITY
Start: 2020-12-16 | End: 1900-01-01

## 2022-02-16 RX ORDER — NIFEDIPINE 30 MG/1
30 TABLET, EXTENDED RELEASE ORAL DAILY
Qty: 90 | Refills: 3 | Status: ACTIVE | COMMUNITY
Start: 2021-01-06

## 2022-02-16 NOTE — HISTORY OF PRESENT ILLNESS
[FreeTextEntry1] : Follow-up visit. The patient is a 42 yo  male with a history of:\par \par SLE: diagnosed 2008 with non-erosive polyarthritis, malar rash, leucopenia, fever, Raynauds, RICHELLE+, anti-DNA+, Ro+, RNP+, Sm+, La negative, LAC negative, ACL negative. He was treated initially with plaquenil, steroids and methotrexate. He was weaned off the steroids by  and continued plaquenil 200 qd and MTX 20 mg/week until 6/10 when he developed nephritis, recurrent joint pain and rash; told he did not need a kidney biopsy because the proteinuria was only 2+ and he was started on prednisone 60 mg qd.  mg BID added to prednisone 60 in 10/10 following an episode of headache (MRI results not available, LP: protein 22, wbc 3, rbc 106, VDRL negative). He remained on prednisone 60 qd and MMF 1000 mg qd until 12/10 when he started seeing me.  \par At his visit 12/28/10 he had 600 mg protein (results from 10/10 through 12/10 all had no proteinuria except one UA with 2+) and the MMF was increased to 3 gm qd. The proteinuria resolved but he had frequent episodes of recurrent arthritis, chilblains on the distal fingers, malar rash and was unable to taper the prednisone to less than 15 mg qd. MMF was stopped 3/23/11 and MTX was restarted.  He had a good response to the MTX which was increased to 25 mg/week; the prednisone was tapered off 10/11.  During this time the complement remained normal and the anti-DNA ab in the 50-90 range. His MTX was decreased to 20 mg/week at his visit 3/5/12. He had another flare with arthritis, malar rash, fatigue and increased anti-DNA  and was enrolled in a double blind study of a BAFF inhibitor 12 that extended to an open-label study  –  when the study ended. His SLE was well-controlled in the study. He continued on MTX 20 and HCQ but flared again 7/15 and was treated with solumedrol 250 mg IVSS and prednisone 10 mg qd was added. IV Benlysta was added 8/11/15 and tapered off the prednisone by 9/7/15. MTX was decreased to 15/week in 3/16, 10/week 16 and stopped 16. He had a mild flare with arthritis and malar rash  and subsequently switched to SQ Benlysta 10/16/18; was taking every other week until  when he switched to q week. Has had persistent disease activity with arthritis, malar rash since  despite enrollment in the AISHA-09 trial (lenabasum/placebo)  – . Required solumedrol 250 mg IVSS  and was on prednisone 10 qd since 18. Severe flare 19; increased steroids to 40 qd for 1 week then to 20. Solumedrol 250 IVSS again on 19 and azathioprine added but he decided not to take this and tapered steroids off by . Flare 10/20 with polyarthritis, patchy alopecia and malar rash on Benlysta; MTX re-started 21. Benlysta stopped (last dose 3/27/21) due to lack of efficacy.\par \par AVN: has moderate to severe AVN in multiple sites; BL shoulders, BL hips, right knee. S/p BL shoulder replacements  and . Pain occasionally requires PRN Percocet. ACL ab and LAC negative on multiple occasions. S/p corticosteroid injection R hip 10/16\par \par Hemorrhoids: episodic BRBPR due to steroids\par \par Asthma: new onset . CT chest 19: unremarkable, PFTs c/w asthma as per pulmonologist (Dr. Langford, [phone]); resolved eventually with steroids and Breztri. Note: Ig levels 20 low normal,  Legionella, fungitell negative, Tcell subsets normal/high, CD19 48.\par \par ACL tear : severed ACL in an accident; had 2 arthrocenteses of bloody fluid and a corticosteroid injection\par \par Last Ophtho: \par \par J&J COVID vaccine on 3/5/21: experienced increased joint/muscle pain, HA, fatigue for 1 day, no fevers, + sore arm for 1 week. Moderna booster (full dose) on 21; muscle aches, fatigue, arm pain x 1 day\par \par Current Hx: The patient was last seen 21. He had a right THR on 10/15/21 and recovery has been slow. Has been doing PT but still has pain at the incision when touched and pain along the right lateral mid thigh when weight bearing. He lost weight intentionally post surgery. The arthritis in his hands and ankles is slightly better. He is very stiff in the mornings and continues to have pain and swelling but tapered himself of steroids 1 month ago (1/15/22). Also c/o some wheezing and dry cough for the past 2 weeks. Denies fevers, nausea, vomiting, diarrhea, abdominal pain, or weight loss. \par \par Allergies:  NSAIDS: cause throat and lip swelling, PCN: rash\par \par Medications:\par Simvastatin 20 (20)\par plaquenil 400 mg qd ((12/28/10)\par CaCO3/vit D BID\par Nifedepine XL 30 mg (11; not taking in the summer)\par Albuterol PRN\par Breztri 2 puffs BID\par Folic acid 1 mg\par Methotrexate 25/week (21)\par \par \par Social: no history of ETOH, tobacco or drug abuse\par             , employed as a , 1 biologic son, 1 adopted son\par \par Family Hx: paternal aunt  of SLE\par Past Hx:  tinea treated with itraconazole \par

## 2022-02-16 NOTE — ASSESSMENT
[FreeTextEntry1] : A/P\par 40 yo male with SLE for many years; MTX re-started/added to Benlysta 1/8/21. Benlysta stopped 3/21 due to lack of efficacy. Not seen for many months; he is s/p right THR with slow recovery and continues to have malar rash and polyarthritis. He is asking about the stem cell trial again. Plan:\par - check labs \par - continue current medications\par - add azithromycin Z-benny and re-start inhaler\par - given a copy of the MiSLE ICF to review\par - f/u 6-8 weeks, possibly sooner if he wants to participate in MiSLE\par \par HUANG Mcguire\par [phone]\par [email or web address]\par

## 2022-02-17 LAB
APPEARANCE: CLEAR
BACTERIA: NEGATIVE
BILIRUBIN URINE: NEGATIVE
BLOOD URINE: NEGATIVE
C3 SERPL-MCNC: 138 MG/DL
C4 SERPL-MCNC: 29 MG/DL
COLOR: YELLOW
COVID-19 SPIKE DOMAIN ANTIBODY INTERPRETATION: POSITIVE
CREAT SPEC-SCNC: 234 MG/DL
CREAT/PROT UR: 0.1 RATIO
DSDNA AB SER-ACNC: <12 IU/ML
GLUCOSE QUALITATIVE U: NEGATIVE
HYALINE CASTS: 0 /LPF
KETONES URINE: NEGATIVE
LEUKOCYTE ESTERASE URINE: NEGATIVE
MICROSCOPIC-UA: NORMAL
NITRITE URINE: NEGATIVE
PH URINE: 5.5
PROT UR-MCNC: 24 MG/DL
PROTEIN URINE: ABNORMAL
RED BLOOD CELLS URINE: 1 /HPF
SARS-COV-2 AB SERPL IA-ACNC: >250 U/ML
SPECIFIC GRAVITY URINE: 1.03
SQUAMOUS EPITHELIAL CELLS: 0 /HPF
UROBILINOGEN URINE: NORMAL
WHITE BLOOD CELLS URINE: 0 /HPF

## 2022-06-22 ENCOUNTER — APPOINTMENT (OUTPATIENT)
Dept: RHEUMATOLOGY | Facility: CLINIC | Age: 42
End: 2022-06-22

## 2022-06-22 VITALS
HEART RATE: 87 BPM | OXYGEN SATURATION: 99 % | TEMPERATURE: 97.8 F | BODY MASS INDEX: 31.65 KG/M2 | DIASTOLIC BLOOD PRESSURE: 86 MMHG | HEIGHT: 65 IN | SYSTOLIC BLOOD PRESSURE: 124 MMHG | RESPIRATION RATE: 16 BRPM | WEIGHT: 190 LBS

## 2022-06-22 RX ORDER — AZITHROMYCIN 250 MG/1
250 TABLET, FILM COATED ORAL
Qty: 6 | Refills: 1 | Status: DISCONTINUED | COMMUNITY
Start: 2022-02-16 | End: 2022-06-22

## 2022-06-22 RX ORDER — ZOLPIDEM TARTRATE 10 MG/1
10 TABLET, FILM COATED ORAL
Refills: 0 | Status: DISCONTINUED | COMMUNITY
End: 2022-06-22

## 2022-06-22 RX ORDER — ERGOCALCIFEROL 1.25 MG/1
1.25 MG CAPSULE ORAL
Qty: 8 | Refills: 0 | Status: DISCONTINUED | COMMUNITY
Start: 2021-06-16 | End: 2022-06-22

## 2022-06-22 RX ORDER — ERYTHROMYCIN 5 MG/G
5 OINTMENT OPHTHALMIC
Qty: 1 | Refills: 3 | Status: DISCONTINUED | COMMUNITY
Start: 2021-03-24 | End: 2022-06-22

## 2022-06-22 RX ORDER — ALENDRONATE SODIUM 70 MG/1
70 TABLET ORAL
Qty: 5 | Refills: 5 | Status: DISCONTINUED | COMMUNITY
Start: 2021-06-16 | End: 2022-06-22

## 2022-06-22 NOTE — ASSESSMENT
[FreeTextEntry1] : A/P\par 43 yo male with SLE for many years; MTX re-started/added to Benlysta 1/8/21. Benlysta stopped 3/21 due to lack of efficacy. Currently has moderate polyarthritis and malar rash despite treatment and is very reluctant to re-start steroids. He is interested in the MiSLE stem cell trial and has agreed to participate (see ICF note below).  Plan:\par - EKG and labs today as per protocol\par - CXR: patient unable to stay for the CXR today, will return to have this done tomorrow morning \par - continue current medications, he will call me to discuss adding prednisone if he feels it is necessary\par - will call him with results of the screening labs and CXR and to schedule the infusion \par \par \par Informed Consent: MiSLE Clinical Trial\par Patient was given a copy of the ICF to take home and read at his last visit. He read it again today. The purpose, potential risks and benefits were reviewed and all questions answered prior to signing. No study procedures were done prior to signing the ICF and the patient was given a copy of the signed ICF. \par \par \par HUANG Mcguire\par [phone]\par [email or web address]\par

## 2022-06-22 NOTE — HISTORY OF PRESENT ILLNESS
[FreeTextEntry1] : Follow-up visit and Screening for the MiSLE trial. The patient is a 43 yo  male with a history of:\par \par SLE: diagnosed 2008 with non-erosive polyarthritis, malar rash, leucopenia, fever, Raynauds, RICHELLE+, anti-DNA+, Ro+, RNP+, Sm+, La negative, LAC negative, ACL negative. He was treated initially with plaquenil, steroids and methotrexate. He was weaned off the steroids by  and continued plaquenil 200 qd and MTX 20 mg/week until 6/10 when he developed nephritis, recurrent joint pain and rash; told he did not need a kidney biopsy because the proteinuria was only 2+ and he was started on prednisone 60 mg qd.  mg BID added to prednisone 60 in 10/10 following an episode of headache (MRI results not available, LP: protein 22, wbc 3, rbc 106, VDRL negative). He remained on prednisone 60 qd and MMF 1000 mg qd until 12/10 when he started seeing me.  \par At his visit 12/28/10 he had 600 mg protein (results from 10/10 through 12/10 all had no proteinuria except one UA with 2+) and the MMF was increased to 3 gm qd. The proteinuria resolved but he had frequent episodes of recurrent arthritis, chilblains on the distal fingers, malar rash and was unable to taper the prednisone to less than 15 mg qd. MMF was stopped 3/23/11 and MTX was restarted.  He had a good response to the MTX which was increased to 25 mg/week; the prednisone was tapered off 10/11.  During this time the complement remained normal and the anti-DNA ab in the 50-90 range. His MTX was decreased to 20 mg/week at his visit 3/5/12. He had another flare with arthritis, malar rash, fatigue and increased anti-DNA  and was enrolled in a double blind study of a BAFF inhibitor 12 that extended to an open-label study  –  when the study ended. His SLE was well-controlled in the study. He continued on MTX 20 and HCQ but flared again 7/15 and was treated with solumedrol 250 mg IVSS and prednisone 10 mg qd was added. IV Benlysta was added 8/11/15 and tapered off the prednisone by 9/7/15. MTX was decreased to 15/week in 3/16, 10/week 16 and stopped 16. He had a mild flare with arthritis and malar rash  and subsequently switched to SQ Benlysta 10/16/18; was taking every other week until  when he switched to q week. Has had persistent disease activity with arthritis, malar rash since  despite enrollment in the AISHA-09 trial (lenabasum/placebo)  – . Required solumedrol 250 mg IVSS  and was on prednisone 10 qd since 18. Severe flare 19; increased steroids to 40 qd for 1 week then to 20. Solumedrol 250 IVSS again on 19 and azathioprine added but he decided not to take this and tapered steroids off by . Flare 10/20 with polyarthritis, patchy alopecia and malar rash on Benlysta; MTX re-started 21. Benlysta stopped (last dose 3/27/21) due to lack of efficacy.\par \par AVN: has moderate to severe AVN in multiple sites; BL shoulders, BL hips, right knee. S/p BL shoulder replacements  and . Pain occasionally requires PRN Percocet. ACL ab and LAC negative on multiple occasions. S/p R hip replacement 10/21\par \par Hemorrhoids: episodic BRBPR due to steroids\par \par Asthma: new onset . CT chest 19: unremarkable, PFTs c/w asthma as per pulmonologist (Dr. Langford, [phone]); resolved eventually with steroids and Breztri. Note: Ig levels 20 low normal,  Legionella, fungitell negative, Tcell subsets normal/high, CD19 48.\par \par Hypercholesterolemia\par \par ACL tear : severed ACL in an accident; had 2 arthrocenteses of bloody fluid and a corticosteroid injection\par \par Last Ophtho: \par \par J&J COVID vaccine on 3/5/21: experienced increased joint/muscle pain, HA, fatigue for 1 day, no fevers, + sore arm for 1 week. Moderna booster (full dose) on 21; muscle aches, fatigue, arm pain x 1 day\par \par Current Hx: The patient was last seen 22. The R hip has recovered except for persistent discomfort at the incision. He c/o ongoing pain/swelling in his hands, ankles and feet, worse over the last 2 weeks. Very stiff in the mornings. Also notes persistent malar rash. Denies fevers, chest pain, dyspnea, cough, abdominal pain, nausea, vomiting, diarrhea, chilblains. Stopped the nifedipine after the weather started getting warm.\par \par Allergies:  NSAIDS: cause throat and lip swelling, PCN: rash\par \par Medications:\par Simvastatin 20 (20)\par plaquenil 400 mg qd ((12/28/10)\par CaCO3/vit D BID\par Albuterol PRN\par Breztri 2 puffs BID\par Folic acid 1 mg\par Methotrexate 25/week (21)\par \par \par Social: no history of ETOH, tobacco or drug abuse\par             , employed as a , 1 biologic son, 1 adopted son\par \par Family Hx: paternal aunt  of SLE\par Past Hx:  tinea treated with itraconazole \par

## 2022-06-23 ENCOUNTER — RESULT REVIEW (OUTPATIENT)
Age: 42
End: 2022-06-23

## 2022-06-23 ENCOUNTER — OUTPATIENT (OUTPATIENT)
Dept: OUTPATIENT SERVICES | Facility: HOSPITAL | Age: 42
LOS: 1 days | End: 2022-06-23
Payer: SUBSIDIZED

## 2022-06-23 DIAGNOSIS — Z00.6 ENCOUNTER FOR EXAMINATION FOR NORMAL COMPARISON AND CONTROL IN CLINICAL RESEARCH PROGRAM: ICD-10-CM

## 2022-06-23 DIAGNOSIS — Z98.89 OTHER SPECIFIED POSTPROCEDURAL STATES: Chronic | ICD-10-CM

## 2022-06-23 DIAGNOSIS — M25.512 PAIN IN LEFT SHOULDER: Chronic | ICD-10-CM

## 2022-06-23 PROCEDURE — 71046 X-RAY EXAM CHEST 2 VIEWS: CPT | Mod: 26

## 2022-06-23 PROCEDURE — 71046 X-RAY EXAM CHEST 2 VIEWS: CPT

## 2022-07-20 ENCOUNTER — APPOINTMENT (OUTPATIENT)
Dept: RHEUMATOLOGY | Facility: CLINIC | Age: 42
End: 2022-07-20

## 2022-07-20 DIAGNOSIS — K21.9 GASTRO-ESOPHAGEAL REFLUX DISEASE W/OUT ESOPHAGITIS: ICD-10-CM

## 2022-07-20 RX ORDER — OMEPRAZOLE 40 MG/1
40 CAPSULE, DELAYED RELEASE ORAL
Qty: 90 | Refills: 3 | Status: ACTIVE | COMMUNITY
Start: 2022-07-20 | End: 1900-01-01

## 2022-07-20 NOTE — HISTORY OF PRESENT ILLNESS
[FreeTextEntry1] : Baseline Visit for the MiSLE trial. The patient is a 41 yo  male with a history of:\par \par SLE: diagnosed 2008 with non-erosive polyarthritis, malar rash, leucopenia, fever, Raynauds, RICHELLE+, anti-DNA+, Ro+, RNP+, Sm+, La negative, LAC negative, ACL negative. He was treated initially with plaquenil, steroids and methotrexate. He was weaned off the steroids by  and continued plaquenil 200 qd and MTX 20 mg/week until 6/10 when he developed nephritis, recurrent joint pain and rash; told he did not need a kidney biopsy because the proteinuria was only 2+ and he was started on prednisone 60 mg qd.  mg BID added to prednisone 60 in 10/10 following an episode of headache (MRI results not available, LP: protein 22, wbc 3, rbc 106, VDRL negative). He remained on prednisone 60 qd and MMF 1000 mg qd until 12/10 when he started seeing me.  \par At his visit 12/28/10 he had 600 mg protein (results from 10/10 through 12/10 all had no proteinuria except one UA with 2+) and the MMF was increased to 3 gm qd. The proteinuria resolved but he had frequent episodes of recurrent arthritis, chilblains on the distal fingers, malar rash and was unable to taper the prednisone to less than 15 mg qd. MMF was stopped 3/23/11 and MTX was restarted.  He had a good response to the MTX which was increased to 25 mg/week; the prednisone was tapered off 10/11.  During this time the complement remained normal and the anti-DNA ab in the 50-90 range. His MTX was decreased to 20 mg/week at his visit 3/5/12. He had another flare with arthritis, malar rash, fatigue and increased anti-DNA  and was enrolled in a double blind study of a BAFF inhibitor 12 that extended to an open-label study  –  when the study ended. His SLE was well-controlled in the study. He continued on MTX 20 and HCQ but flared again 7/15 and was treated with solumedrol 250 mg IVSS and prednisone 10 mg qd was added. IV Benlysta was added 8/11/15 and tapered off the prednisone by 9/7/15. MTX was decreased to 15/week in 3/16, 10/week 16 and stopped 16. He had a mild flare with arthritis and malar rash  and subsequently switched to SQ Benlysta 10/16/18; was taking every other week until  when he switched to q week. Has had persistent disease activity with arthritis, malar rash since  despite enrollment in the AISHA-09 trial (lenabasum/placebo)  – . Required solumedrol 250 mg IVSS  and was on prednisone 10 qd since 18. Severe flare 19; increased steroids to 40 qd for 1 week then to 20. Solumedrol 250 IVSS again on 19 and azathioprine added but he decided not to take this and tapered steroids off by . Flare 10/20 with polyarthritis, patchy alopecia and malar rash on Benlysta; MTX re-started 21. Benlysta stopped (last dose 3/27/21) due to lack of efficacy.\par \par AVN: has moderate to severe AVN in multiple sites; BL shoulders, BL hips, right knee. S/p BL shoulder replacements  and . Pain occasionally requires PRN Percocet. ACL ab and LAC negative on multiple occasions. S/p R hip replacement 10/21\par \par Hemorrhoids: episodic BRBPR due to steroids\par \par Asthma: new onset . CT chest 19: unremarkable, PFTs c/w asthma as per pulmonologist (Dr. Langford, [phone]); resolved eventually with steroids and Breztri. Note: Ig levels 20 low normal,  Legionella, fungitell negative, Tcell subsets normal/high, CD19 48.\par \par Hypercholesterolemia\par \par ACL tear : severed ACL in an accident; had 2 arthrocenteses of bloody fluid and a corticosteroid injection\par \par Last Ophtho: \par \par J&J COVID vaccine on 3/5/21: experienced increased joint/muscle pain, HA, fatigue for 1 day, no fevers, + sore arm for 1 week. Moderna booster (full dose) on 21; muscle aches, fatigue, arm pain x 1 day\par \par Current Hx: The patient was last seen 22 for Screening. There has been no change in the pain/swelling in his hands, ankles and feet since that visit. Very stiff in the mornings and has a persistent malar rash. Able to work on the computer but sometimes has trouble cooking or doing things that require lifting with his hands. Denies fevers, chest pain, dyspnea, cough, abdominal pain, nausea, vomiting, diarrhea, chilblains. \par \par Allergies:  NSAIDS: cause throat and lip swelling, PCN: rash\par \par Medications:\par Simvastatin 20 (20)\par plaquenil 400 mg qd ((12/28/10)\par CaCO3/vit D BID\par Albuterol PRN\par Breztri 2 puffs BID\par Folic acid 1 mg\par Methotrexate 25/week (21)\par \par \par Social: no history of ETOH, tobacco or drug abuse\par             , employed as a , 1 biologic son, 1 adopted son\par \par Family Hx: paternal aunt  of SLE\par Past Hx:  tinea treated with itraconazole \par

## 2022-07-20 NOTE — ASSESSMENT
[FreeTextEntry1] : A/P\par 43 yo male with SLE for many years; MTX re-started/added to Benlysta 1/8/21. Benlysta stopped 3/21 due to lack of efficacy. Currently has moderate polyarthritis and malar rash despite treatment and is very reluctant to re-start steroids. He is interested in the MiSLE stem cell trial and has agreed to participate (see ICF note below).  Plan:\par - EKG and labs today as per protocol\par - CXR: patient unable to stay for the CXR today, will return to have this done tomorrow morning \par - continue current medications, he will call me to discuss adding prednisone if he feels it is necessary\par - will call him with results of the screening labs and CXR and to schedule the infusion \par \par \par \par \par HUANG Mcguire\par [phone]\par [email or web address]\par

## 2022-07-25 ENCOUNTER — APPOINTMENT (OUTPATIENT)
Dept: RHEUMATOLOGY | Facility: CLINIC | Age: 42
End: 2022-07-25

## 2022-07-25 VITALS
WEIGHT: 192.38 LBS | HEART RATE: 90 BPM | OXYGEN SATURATION: 97 % | BODY MASS INDEX: 32.01 KG/M2 | TEMPERATURE: 97.7 F | SYSTOLIC BLOOD PRESSURE: 140 MMHG | DIASTOLIC BLOOD PRESSURE: 90 MMHG | RESPIRATION RATE: 16 BRPM

## 2022-08-01 ENCOUNTER — APPOINTMENT (OUTPATIENT)
Dept: RHEUMATOLOGY | Facility: CLINIC | Age: 42
End: 2022-08-01